# Patient Record
Sex: FEMALE | URBAN - METROPOLITAN AREA
[De-identification: names, ages, dates, MRNs, and addresses within clinical notes are randomized per-mention and may not be internally consistent; named-entity substitution may affect disease eponyms.]

---

## 2022-06-17 ENCOUNTER — TELEPHONE ENCOUNTER (OUTPATIENT)
Dept: URBAN - METROPOLITAN AREA CLINIC 72 | Facility: CLINIC | Age: 72
End: 2022-06-17

## 2022-07-27 ENCOUNTER — NEW PATIENT (OUTPATIENT)
Dept: URBAN - METROPOLITAN AREA CLINIC 21 | Facility: CLINIC | Age: 72
End: 2022-07-27

## 2022-07-27 DIAGNOSIS — H52.4: ICD-10-CM

## 2022-07-27 DIAGNOSIS — H25.813: ICD-10-CM

## 2022-07-27 PROCEDURE — 92015 DETERMINE REFRACTIVE STATE: CPT

## 2022-07-27 PROCEDURE — 92004 COMPRE OPH EXAM NEW PT 1/>: CPT

## 2022-07-27 ASSESSMENT — KERATOMETRY
OD_AXISANGLE_DEGREES: 18
OD_AXISANGLE2_DEGREES: 108
OS_AXISANGLE_DEGREES: 20
OD_K2POWER_DIOPTERS: 45.00
OD_K1POWER_DIOPTERS: 44.00
OS_AXISANGLE2_DEGREES: 110
OS_K2POWER_DIOPTERS: 45.00
OS_K1POWER_DIOPTERS: 44.25

## 2022-07-27 ASSESSMENT — VISUAL ACUITY
OD_CC: 20/40
OU_CC: 20/20-1
OS_CC: 20/25-1

## 2022-07-27 ASSESSMENT — TONOMETRY
OS_IOP_MMHG: 15
OD_IOP_MMHG: 17

## 2024-09-10 ENCOUNTER — COMPREHENSIVE EXAM (OUTPATIENT)
Dept: URBAN - METROPOLITAN AREA CLINIC 20 | Facility: CLINIC | Age: 74
End: 2024-09-10

## 2024-09-10 DIAGNOSIS — H52.4: ICD-10-CM

## 2024-09-10 PROCEDURE — 92015 DETERMINE REFRACTIVE STATE: CPT

## 2024-09-10 PROCEDURE — 92012 INTRM OPH EXAM EST PATIENT: CPT
